# Patient Record
(demographics unavailable — no encounter records)

---

## 2024-10-29 NOTE — PHYSICAL EXAM
[Antalgic] : not antalgic [LE] : Sensory: Intact in bilateral lower extremities [de-identified] : GENERAL APPEARANCE: Well nourished and hydrated, pleasant, alert, and oriented x 3. Appears their stated age.  HEENT: Normocephalic, extraocular eye motion intact. Nasal septum midline. Oral cavity clear. External auditory canal clear.  RESPIRATORY: Breath sounds clear and audible in all lobes. No wheezing, No accessory muscle use. CARDIOVASCULAR: No apparent abnormalities. No lower leg edema. No varicosities. Pedal pulses are palpable. NEUROLOGIC: Sensation is normal, no muscle weakness in the upper or lower extremities. DERMATOLOGIC: No apparent skin lesions, moist, warm, no rash. SPINE: Cervical spine appears normal and moves freely; thoracic spine appears normal and moves freely; lumbosacral spine appears normal and moves freely, normal, nontender. MUSCULOSKELETAL: Hands, wrists, and elbows are normal and move freely, shoulders are normal and move freely.  Musculoskeletal 5/5 motor strength in bilateral lower extremities. Sensory: Intact in bilateral lower extremities. DTRs: Biceps, brachioradialis, triceps, patellar, ankle and plantar 2+ and symmetric bilaterally. Pulses: dorsalis pedis, posterior tibial, femoral, popliteal, and radial 2+ and symmetric bilaterally.  Constitutional: Alert and in no acute distress, but well-appearing.   [de-identified] : Right hip examination shows straight leg raise without groin pain and full range of motion negative Stinchfield maneuver Left knee examination shows mild varus alignment no joint effusion range of motion is 0 to 125 degree

## 2024-10-29 NOTE — DISCUSSION/SUMMARY
[de-identified] : Medication risks reviewed. Surgical risks reviewed. 69 y/o M pt presents with right lower back pain and left knee pain.  Left knee: The pt presents with bone-on-bone medial compartmental osteoarthritis of the left knee. The nature of his condition and treatment options were discussed in detail. The pt is a candidate for a left TKA. The surgery was discussed in detail. I discussed conservative treatment such as cortisone injections, HA injections, PT, anti-inflammatories, and low impact exercise. He should continue to do low impact exercises. The pt may take OTC NSAIDs and Tylenol as needed.  He has a history of MRSA infection with joint injection many years ago treated with IV antibiotics .  Patient deferred repeat injection today he may return to office to have cortisone injection before ski trip .   Right hip: The pt presents with mild right hip osteoarthritis. The pt is not a candidate for a right TORY. At this time, I suggest that the pt exhausts conservative treatment such as cortisone injections, PT, anti-inflammatories, and low impact exercise. He should continue to do low impact exercises. The pt notes improvement in pain and defers from treatment.  He complains of right sided lower back pain and right foot drop I recommend patient to  see Dr. Toure.   The patient is a 70 year year old individual with end stage arthritis of their left knee joint. The patient has exhausted a minimum of 3 months conservative treatment including prior injections (cortisone and/or hyaluronic acid injections), physical therapy, over the counter NSAIDS and pain medication where indicated. In addition, the patient's quality of life is diminished due to significant chronic pain. The patient is having difficulty with activities of daily living, including ambulating, descending stairs, and rising from a seated position. Based upon the patients continued symptoms and failure to respond to conservative treatment, I have recommended a left total knee replacement for this patient. A long discussion took place with the patient describing what a total joint replacement is and what the procedure would entail. A knee model, similar to the implant that will be used during the operation, was utilized to demonstrate and to discuss the various bearing surfaces of the implants. Implant fixation, use of cement, was also discussed with the patient. Choices of implant manufacturers, mainly [employer] and [employer], were discussed and reviewed with preference to be made by patient and surgeon prior to operation. Final selection to be based on customary practice as well as preoperative templating with selection confirmed intraoperatively based on the patient's anatomy. The patient participated and agreed with the decision-making process. The hospitalization and post-operative care and rehabilitation were also discussed. The use of perioperative antibiotics and DVT prophylaxis were discussed. The risk, benefits and alternatives to a surgical intervention were discussed at length with the patient. The patient was also advised of risks related to the medical comorbidities and elevated body mass index (BMI). A lengthy discussion took place to review the most common complications including but not limited to: deep vein thrombosis, pulmonary embolism, heart attack, stroke, infection, wound breakdown, numbness, damage to nerves, tendon, muscles, arteries or other blood vessels, death and other possible complications from anesthesia. The patient was told that we will take steps to minimize these risks by using sterile technique, antibiotics and DVT prophylaxis when appropriate and follow the patient postoperatively in the office setting to monitor progress. The possibility of recurrent pain, no improvement in pain and actual worsening of pain were also discussed with the patient.  The discharge plan of care focused on the patient going home following surgery. The patient was encouraged to make the necessary arrangements to have someone stay with them when they are discharged home. Following discharge, a home care nurse will visit the patient. The home care nurse will open your home care case and request home physical therapy services. Home physical therapy will commence following discharge provided it is appropriate and covered by the health insurance benefit plan.  The benefits of surgery were discussed with the patient including the potential for improving his/her current clinical condition through operative intervention. Alternatives to surgical intervention including continued conservative management were also discussed in detail. All questions were answered to the satisfaction of the patient. The treatment plan of care, as well as a model of a total knee equivalent to the one that will be used for their total joint replacement, was shared with the patient. The patient participated and agreed to the plan of care as well as the use of the recommended implants for their total joint replacement surgery.

## 2024-10-29 NOTE — HISTORY OF PRESENT ILLNESS
[de-identified] : 10/29/24)  Patient return to office complaining of recurrent pain in the left knee  He also reports right lower back pain we evaluated his right hip which showed mild arthritic change only he states his right foot is dropping.  He denies numbness or tingling  9/4/2024: Pt is back with left knee pain. Pt received left knee supartz injection in July.  He feels some improvement with Supartz he has mild intermittent pain at this time patient is aware that he is a candidate for knee replacement and he would like to postpone a bit.  Patient is active with skiing.   7/16/2024: This is a 69-year-old male presented with right hip and left knee pain his right hip pain was quite painful about 5 weeks ago but it did settle down at this time pain is 2 out of 10 it was achy patient had x-rays ordered by Dr. Bolton the orthopedic surgeon who has been treating his left knee patient  had left knee arthroscopy in 2015 he received gel injection in December 2023 which was helping him but now the pain has recurred he is using meloxicam prescribed by primary care physician daily.  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  He reports history of MRSA infection after joint injection in the left knee many years ago he was treated with IV antibiotic followed by ID  he is no longer on antibiotic [Pain Location] : pain [5] : a current pain level of 5/10

## 2024-10-29 NOTE — PROCEDURE
[de-identified] : Patient received left knee 80mg cortisone injection for osteoarthritis  I discussed at length with the patient the planned steroid and lidocaine injection. The risks, benefits, convalescence and alternatives were reviewed. The possible side effects discussed included but were not limited to: pain, swelling, heat, bleeding, and redness. Symptoms are generally mild but if they are extensive then contact the office. Giving pain relievers by mouth such as NSAIDs or Tylenol can generally treat the reactions to steroid and lidocaine. Rare cases of infection have been noted. Rash, hives and itching may occur post injection. If you have muscle pain or cramps, flushing and or swelling of the face, rapid heart beat, nausea, dizziness, fever, chills, headache, difficulty breathing, swelling in the arms or legs, or have a prickly feeling of your skin, contact a health care provider immediately. Following this discussion, the knee was prepped with Alcohol and under sterile condition the 80 mg Depo-Medrol and 6 cc Lidocaine injection was performed with a 20 gauge needle through a superolateral injection site. The needle was introduced into the joint, aspiration was performed to ensure intra-articular placement and the medication was injected. Upon withdrawal of the needle the site was cleaned with alcohol and a band aid applied. The patient tolerated the injection well and there were no adverse effects. Post injection instructions included no strenuous activity for 24 hours, cryotherapy and if there are any adverse effects to contact the office.

## 2024-10-29 NOTE — DISCUSSION/SUMMARY
[de-identified] : Medication risks reviewed. Surgical risks reviewed. 69 y/o M pt presents with right lower back pain and left knee pain.  Left knee: The pt presents with bone-on-bone medial compartmental osteoarthritis of the left knee. The nature of his condition and treatment options were discussed in detail. The pt is a candidate for a left TKA. The surgery was discussed in detail. I discussed conservative treatment such as cortisone injections, HA injections, PT, anti-inflammatories, and low impact exercise. He should continue to do low impact exercises. The pt may take OTC NSAIDs and Tylenol as needed.  He has a history of MRSA infection with joint injection many years ago treated with IV antibiotics .  Patient deferred repeat injection today he may return to office to have cortisone injection before ski trip .   Right hip: The pt presents with mild right hip osteoarthritis. The pt is not a candidate for a right TORY. At this time, I suggest that the pt exhausts conservative treatment such as cortisone injections, PT, anti-inflammatories, and low impact exercise. He should continue to do low impact exercises. The pt notes improvement in pain and defers from treatment.  He complains of right sided lower back pain and right foot drop I recommend patient to  see Dr. Toure.   The patient is a 70 year year old individual with end stage arthritis of their left knee joint. The patient has exhausted a minimum of 3 months conservative treatment including prior injections (cortisone and/or hyaluronic acid injections), physical therapy, over the counter NSAIDS and pain medication where indicated. In addition, the patient's quality of life is diminished due to significant chronic pain. The patient is having difficulty with activities of daily living, including ambulating, descending stairs, and rising from a seated position. Based upon the patients continued symptoms and failure to respond to conservative treatment, I have recommended a left total knee replacement for this patient. A long discussion took place with the patient describing what a total joint replacement is and what the procedure would entail. A knee model, similar to the implant that will be used during the operation, was utilized to demonstrate and to discuss the various bearing surfaces of the implants. Implant fixation, use of cement, was also discussed with the patient. Choices of implant manufacturers, mainly [employer] and [employer], were discussed and reviewed with preference to be made by patient and surgeon prior to operation. Final selection to be based on customary practice as well as preoperative templating with selection confirmed intraoperatively based on the patient's anatomy. The patient participated and agreed with the decision-making process. The hospitalization and post-operative care and rehabilitation were also discussed. The use of perioperative antibiotics and DVT prophylaxis were discussed. The risk, benefits and alternatives to a surgical intervention were discussed at length with the patient. The patient was also advised of risks related to the medical comorbidities and elevated body mass index (BMI). A lengthy discussion took place to review the most common complications including but not limited to: deep vein thrombosis, pulmonary embolism, heart attack, stroke, infection, wound breakdown, numbness, damage to nerves, tendon, muscles, arteries or other blood vessels, death and other possible complications from anesthesia. The patient was told that we will take steps to minimize these risks by using sterile technique, antibiotics and DVT prophylaxis when appropriate and follow the patient postoperatively in the office setting to monitor progress. The possibility of recurrent pain, no improvement in pain and actual worsening of pain were also discussed with the patient.  The discharge plan of care focused on the patient going home following surgery. The patient was encouraged to make the necessary arrangements to have someone stay with them when they are discharged home. Following discharge, a home care nurse will visit the patient. The home care nurse will open your home care case and request home physical therapy services. Home physical therapy will commence following discharge provided it is appropriate and covered by the health insurance benefit plan.  The benefits of surgery were discussed with the patient including the potential for improving his/her current clinical condition through operative intervention. Alternatives to surgical intervention including continued conservative management were also discussed in detail. All questions were answered to the satisfaction of the patient. The treatment plan of care, as well as a model of a total knee equivalent to the one that will be used for their total joint replacement, was shared with the patient. The patient participated and agreed to the plan of care as well as the use of the recommended implants for their total joint replacement surgery.

## 2024-10-29 NOTE — PROCEDURE
[de-identified] : Patient received left knee 80mg cortisone injection for osteoarthritis  I discussed at length with the patient the planned steroid and lidocaine injection. The risks, benefits, convalescence and alternatives were reviewed. The possible side effects discussed included but were not limited to: pain, swelling, heat, bleeding, and redness. Symptoms are generally mild but if they are extensive then contact the office. Giving pain relievers by mouth such as NSAIDs or Tylenol can generally treat the reactions to steroid and lidocaine. Rare cases of infection have been noted. Rash, hives and itching may occur post injection. If you have muscle pain or cramps, flushing and or swelling of the face, rapid heart beat, nausea, dizziness, fever, chills, headache, difficulty breathing, swelling in the arms or legs, or have a prickly feeling of your skin, contact a health care provider immediately. Following this discussion, the knee was prepped with Alcohol and under sterile condition the 80 mg Depo-Medrol and 6 cc Lidocaine injection was performed with a 20 gauge needle through a superolateral injection site. The needle was introduced into the joint, aspiration was performed to ensure intra-articular placement and the medication was injected. Upon withdrawal of the needle the site was cleaned with alcohol and a band aid applied. The patient tolerated the injection well and there were no adverse effects. Post injection instructions included no strenuous activity for 24 hours, cryotherapy and if there are any adverse effects to contact the office.

## 2024-10-29 NOTE — HISTORY OF PRESENT ILLNESS
[de-identified] : 10/29/24)  Patient return to office complaining of recurrent pain in the left knee  He also reports right lower back pain we evaluated his right hip which showed mild arthritic change only he states his right foot is dropping.  He denies numbness or tingling  9/4/2024: Pt is back with left knee pain. Pt received left knee supartz injection in July.  He feels some improvement with Supartz he has mild intermittent pain at this time patient is aware that he is a candidate for knee replacement and he would like to postpone a bit.  Patient is active with skiing.   7/16/2024: This is a 69-year-old male presented with right hip and left knee pain his right hip pain was quite painful about 5 weeks ago but it did settle down at this time pain is 2 out of 10 it was achy patient had x-rays ordered by Dr. Bolton the orthopedic surgeon who has been treating his left knee patient  had left knee arthroscopy in 2015 he received gel injection in December 2023 which was helping him but now the pain has recurred he is using meloxicam prescribed by primary care physician daily.  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  He reports history of MRSA infection after joint injection in the left knee many years ago he was treated with IV antibiotic followed by ID  he is no longer on antibiotic [Pain Location] : pain [5] : a current pain level of 5/10

## 2024-10-29 NOTE — PHYSICAL EXAM
[Antalgic] : not antalgic [LE] : Sensory: Intact in bilateral lower extremities [de-identified] : GENERAL APPEARANCE: Well nourished and hydrated, pleasant, alert, and oriented x 3. Appears their stated age.  HEENT: Normocephalic, extraocular eye motion intact. Nasal septum midline. Oral cavity clear. External auditory canal clear.  RESPIRATORY: Breath sounds clear and audible in all lobes. No wheezing, No accessory muscle use. CARDIOVASCULAR: No apparent abnormalities. No lower leg edema. No varicosities. Pedal pulses are palpable. NEUROLOGIC: Sensation is normal, no muscle weakness in the upper or lower extremities. DERMATOLOGIC: No apparent skin lesions, moist, warm, no rash. SPINE: Cervical spine appears normal and moves freely; thoracic spine appears normal and moves freely; lumbosacral spine appears normal and moves freely, normal, nontender. MUSCULOSKELETAL: Hands, wrists, and elbows are normal and move freely, shoulders are normal and move freely.  Musculoskeletal 5/5 motor strength in bilateral lower extremities. Sensory: Intact in bilateral lower extremities. DTRs: Biceps, brachioradialis, triceps, patellar, ankle and plantar 2+ and symmetric bilaterally. Pulses: dorsalis pedis, posterior tibial, femoral, popliteal, and radial 2+ and symmetric bilaterally.  Constitutional: Alert and in no acute distress, but well-appearing.   [de-identified] : Right hip examination shows straight leg raise without groin pain and full range of motion negative Stinchfield maneuver Left knee examination shows mild varus alignment no joint effusion range of motion is 0 to 125 degree

## 2024-12-09 NOTE — PHYSICAL EXAM
[de-identified] : CONSTITUTIONAL: Patient is a very pleasant individual who is well-nourished and appears stated age. PSYCHIATRIC: Alert and oriented times three and in no apparent distress, and participates with orthopedic evaluation well. HEAD: Atraumatic and nonsyndromic in appearance. EENT: No thyromegaly, EOMI. RESPIRATORY: Respiratory rate is regular, not dyspneic on examination. LYMPHATICS: There is no cervical or axillary lymphadenopathy. INTEGUMENTARY: Skin is clean, dry, and intact to bilateral lower extremities. VASCULAR: There is brisk capillary refill about the bilateral Lower Extremities with 2+ DP Pulse  Palpation: No significant pain lower lumbar spine No pain with internal or external range of motion bilateral hips  Muscle Strength Testing: Hip Flexion: 5/5 B/L Knee Extension: 5/5 B/L Knee Flexion: 5/5 B/L Dorsiflexion: 5/5 LLE, 4/5 RLE EHL: 5/5 LLE, 4/5 RLE Plantarflexion: 5/5 B/L  Sensation: SILT L2-S1 B/L except: None  Reflexes: 2+ Quadriceps/Achilles  Gait: Normal gait Able to stand on toes and able to stand on heels  Special Testing:  Negative SLR BLLE Negative clonus BLLE  [de-identified] : Standing AP, lateral, flexion and extension radiographs of the lumbar spine performed on 12/9/2024 in the Radiology Department at [facility] for the indication of low back pain are reviewed.  These studies demonstrate small degenerative scoliosis can calving the left lower lumbar spine Lateral radiographs demonstrates maintained lordosis retrolisthesis of L2 and L3 mild disc height loss at L2-3 L3-4 L4-5 with multilevel facet arthropathy from L3-L5

## 2024-12-09 NOTE — ASSESSMENT
[FreeTextEntry1] : 70-year-old male with spinal stenosis neurogenic claudication, right foot drop  I discussed with Bashir that I believe his symptoms several months ago were likely related to spinal stenosis with neurogenic claudication.  He has developed a subtle right foot drop he states that the weakness is usually better in the mornings and will start to worsen throughout the day.  I discussed and the natural history of spinal stenosis neurogenic claudication and that would recommend a lumbar MRI to properly and thoroughly evaluate We will also evaluate the source of his right foot drop/weakness I will see him back after the MRI to go over the results

## 2024-12-09 NOTE — HISTORY OF PRESENT ILLNESS
[de-identified] : Chief Complaint: Low back pain and right foot drop   History of Present Illness: 70-year-old male presenting today for initial evaluation for low back pain and bilateral hip pain and right foot weakness.  Bashir has a history of ankylosing spondylitis in June and about 6 months ago he was developing symptoms of bilateral groin and anterior thigh and back pain worsening with standing and walking and would be relieved once he sat down after 1 minute and then he would get up and walk again.  This went on for several months, positive shopping cart sign.  No lower extremity numbness.  The symptoms have improved about 3 months ago he developed right foot weakness no lower extremity pain but he notices that when he walks his foot will drag, , this gets worse throughout the day.  He denies any radicular symptoms.  He denies any back pain at this time.   Past medical history, past surgical history, medications, allergies, social history, and family history are as documented in our records today.  Notable items include: Ankylosing spondylitis   Review of Systems: I have reviewed the patient's documented Review of Systems data today, I concur with this documentation.

## 2025-01-02 NOTE — PHYSICAL EXAM
[de-identified] : MRI lumbar spine performed in [facility] on 12/12/2024 axial sagittal T1-T2 through weighted images demonstrates at L2-3 there is a left paracentral disc extrusion compressing the traversing left L3 nerve root At L3-4 there is residual remnants of the extruded disc causing left foraminal stenosis compression of the exiting left L3 nerve root At L4-5 there is disc bulging ligamentum flavum hypertrophy contributing to lateral recess stenosis compression of the right L5 nerve root L5-S1 mild degenerative changes

## 2025-01-02 NOTE — REASON FOR VISIT
[Follow-Up Visit] : a follow-up visit for [Back Pain] : back pain [Spouse] : spouse [Family Member] : family member

## 2025-01-02 NOTE — ASSESSMENT
[FreeTextEntry1] : 70-year-old male with lumbar spondylosis  I discussed with Bashir that his severe left hip pain can be caused from that extruded disc compression of the left L3 nerve root however the symptoms have resolved His right foot weakness is also improved and is related to his L4-L5 spondylosis compression right L5 nerve root and lateral recess.  Currently have no symptoms and his strength is improved We will continue conservative management he will come back and see me if his symptoms worsen or if he has any new issues in the future

## 2025-01-02 NOTE — HISTORY OF PRESENT ILLNESS
[de-identified] : Chief Complaint: Low back and leg pain   History of Present Illness: 70-year-old male with a history of severe bilateral left greater than right hip pain and right foot weakness/dropfoot here today to review his lumbar MRI.  He states that his back pain is improved his leg pain has improved his right foot weakness is also improved he is currently active with minimal complaints.   Past medical history, past surgical history, medications, allergies, social history, and family history are as documented in our records today.  Notable items include: None   Review of Systems: I have reviewed the patient's documented Review of Systems data today, I concur with this documentation.

## 2025-02-04 NOTE — PROCEDURE
[de-identified] : Patient received left knee 80mg cortisone injection for osteoarthritis  I discussed at length with the patient the planned steroid and lidocaine injection. The risks, benefits, convalescence and alternatives were reviewed. The possible side effects discussed included but were not limited to: pain, swelling, heat, bleeding, and redness. Symptoms are generally mild but if they are extensive then contact the office. Giving pain relievers by mouth such as NSAIDs or Tylenol can generally treat the reactions to steroid and lidocaine. Rare cases of infection have been noted. Rash, hives and itching may occur post injection. If you have muscle pain or cramps, flushing and or swelling of the face, rapid heart beat, nausea, dizziness, fever, chills, headache, difficulty breathing, swelling in the arms or legs, or have a prickly feeling of your skin, contact a health care provider immediately. Following this discussion, the knee was prepped with Alcohol and under sterile condition the 80 mg Depo-Medrol and 6 cc Lidocaine injection was performed with a 20 gauge needle through a superolateral injection site. The needle was introduced into the joint, aspiration was performed to ensure intra-articular placement and the medication was injected. Upon withdrawal of the needle the site was cleaned with alcohol and a band aid applied. The patient tolerated the injection well and there were no adverse effects. Post injection instructions included no strenuous activity for 24 hours, cryotherapy and if there are any adverse effects to contact the office.

## 2025-02-04 NOTE — HISTORY OF PRESENT ILLNESS
[de-identified] :  2/24/25) patient is here for follow-up of left knee pain he has end-stage osteoarthritis but he is pain has been controlled with cortisone and HA injection.  He had problem with lower back pain and right foot drop of left hip pain seen by Dr. Toure currently he denies lower back pain and right foot drop left hip pain has resolved he is working  10/29/24)  Patient return to office complaining of recurrent pain in the left knee  He also reports right lower back pain we evaluated his right hip which showed mild arthritic change only he states his right foot is dropping.  He denies numbness or tingling  9/4/2024: Pt is back with left knee pain. Pt received left knee supartz injection in July.  He feels some improvement with Supartz he has mild intermittent pain at this time patient is aware that he is a candidate for knee replacement and he would like to postpone a bit.  Patient is active with skiing.   7/16/2024: This is a 69-year-old male presented with right hip and left knee pain his right hip pain was quite painful about 5 weeks ago but it did settle down at this time pain is 2 out of 10 it was achy patient had x-rays ordered by Dr. Bolton the orthopedic surgeon who has been treating his left knee patient  had left knee arthroscopy in 2015 he received gel injection in December 2023 which was helping him but now the pain has recurred he is using meloxicam prescribed by primary care physician daily.  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  He reports history of MRSA infection after joint injection in the left knee many years ago he was treated with IV antibiotic followed by ID  he is no longer on antibiotic [Pain Location] : pain [Stable] : stable [6] : a current pain level of 6/10 [Walking] : worsened by walking [Rest] : relieved by rest

## 2025-02-04 NOTE — DISCUSSION/SUMMARY
[de-identified] : Medication risks reviewed. Surgical risks reviewed. 71 y/o M pt presents with left knee pain.  Left knee: The pt presents with bone-on-bone medial compartmental osteoarthritis of the left knee. The nature of his condition and treatment options were discussed in detail. The pt is a candidate for a left TKA. The surgery was discussed in detail. I discussed conservative treatment such as cortisone injections, HA injections, PT, anti-inflammatories, and low impact exercise. He should continue to do low impact exercises. The pt may take OTC NSAIDs and Tylenol as needed.  He has a history of MRSA infection with joint injection many years ago treated with IV antibiotics .  Patient deferred repeat injection today.  Patient is interested pursuing HA injection which helped reducing his pain last series in July 2024 I ordered Supartz injection today patient will return to office when the medication is available   The patient is a 70 year year old individual with end stage arthritis of their left knee joint. The patient has exhausted a minimum of 3 months conservative treatment including prior injections (cortisone and/or hyaluronic acid injections), physical therapy, over the counter NSAIDS and pain medication where indicated. In addition, the patient's quality of life is diminished due to significant chronic pain. The patient is having difficulty with activities of daily living, including ambulating, descending stairs, and rising from a seated position. Based upon the patients continued symptoms and failure to respond to conservative treatment, I have recommended a left total knee replacement for this patient. A long discussion took place with the patient describing what a total joint replacement is and what the procedure would entail. A knee model, similar to the implant that will be used during the operation, was utilized to demonstrate and to discuss the various bearing surfaces of the implants. Implant fixation, use of cement, was also discussed with the patient. Choices of implant manufacturers, mainly [employer] and [employer], were discussed and reviewed with preference to be made by patient and surgeon prior to operation. Final selection to be based on customary practice as well as preoperative templating with selection confirmed intraoperatively based on the patient's anatomy. The patient participated and agreed with the decision-making process. The hospitalization and post-operative care and rehabilitation were also discussed. The use of perioperative antibiotics and DVT prophylaxis were discussed. The risk, benefits and alternatives to a surgical intervention were discussed at length with the patient. The patient was also advised of risks related to the medical comorbidities and elevated body mass index (BMI). A lengthy discussion took place to review the most common complications including but not limited to: deep vein thrombosis, pulmonary embolism, heart attack, stroke, infection, wound breakdown, numbness, damage to nerves, tendon, muscles, arteries or other blood vessels, death and other possible complications from anesthesia. The patient was told that we will take steps to minimize these risks by using sterile technique, antibiotics and DVT prophylaxis when appropriate and follow the patient postoperatively in the office setting to monitor progress. The possibility of recurrent pain, no improvement in pain and actual worsening of pain were also discussed with the patient.  The discharge plan of care focused on the patient going home following surgery. The patient was encouraged to make the necessary arrangements to have someone stay with them when they are discharged home. Following discharge, a home care nurse will visit the patient. The home care nurse will open your home care case and request home physical therapy services. Home physical therapy will commence following discharge provided it is appropriate and covered by the health insurance benefit plan.  The benefits of surgery were discussed with the patient including the potential for improving his/her current clinical condition through operative intervention. Alternatives to surgical intervention including continued conservative management were also discussed in detail. All questions were answered to the satisfaction of the patient. The treatment plan of care, as well as a model of a total knee equivalent to the one that will be used for their total joint replacement, was shared with the patient. The patient participated and agreed to the plan of care as well as the use of the recommended implants for their total joint replacement surgery.

## 2025-02-04 NOTE — PHYSICAL EXAM
[Antalgic] : not antalgic [LE] : Sensory: Intact in bilateral lower extremities [de-identified] : GENERAL APPEARANCE: Well nourished and hydrated, pleasant, alert, and oriented x 3. Appears their stated age.  HEENT: Normocephalic, extraocular eye motion intact. Nasal septum midline. Oral cavity clear. External auditory canal clear.  RESPIRATORY: Breath sounds clear and audible in all lobes. No wheezing, No accessory muscle use. CARDIOVASCULAR: No apparent abnormalities. No lower leg edema. No varicosities. Pedal pulses are palpable. NEUROLOGIC: Sensation is normal, no muscle weakness in the upper or lower extremities. DERMATOLOGIC: No apparent skin lesions, moist, warm, no rash. SPINE: Cervical spine appears normal and moves freely; thoracic spine appears normal and moves freely; lumbosacral spine appears normal and moves freely, normal, nontender. MUSCULOSKELETAL: Hands, wrists, and elbows are normal and move freely, shoulders are normal and move freely.  Musculoskeletal 5/5 motor strength in bilateral lower extremities. Sensory: Intact in bilateral lower extremities. DTRs: Biceps, brachioradialis, triceps, patellar, ankle and plantar 2+ and symmetric bilaterally. Pulses: dorsalis pedis, posterior tibial, femoral, popliteal, and radial 2+ and symmetric bilaterally.  Constitutional: Alert and in no acute distress, but well-appearing.   [de-identified] : Left knee examination shows mild varus alignment no joint effusion range of motion is 0 to 125 degree

## 2025-05-06 NOTE — REASON FOR VISIT
[Follow-Up Visit] : a follow-up visit for [Other: ____] : [unfilled] [FreeTextEntry2] : Left knee Supartz Lot # [id number] exp 09/30/27

## 2025-05-06 NOTE — PROCEDURE
[de-identified] : Pt received left knee knee 1/3 Supartz 25 mg injection for o.a Knee injection viscosupplementation: I discussed at length with the patient the planned H.A injection for primary osteoarthritis. The risks, benefits, convalescence and alternatives were reviewed and pt consented for injection. The possible side effects discussed included but were not limited to: pain, swelling, heat, stiffness and fullness. There symptoms are generally mild but if they are extensive then contact the office. Giving pain relievers by mouth such as NSAID's or Tylenol can generally treat the reactions to injection. Rare cases of infection have been noted. Rash, hives and itching may occur post injection. If you have muscle pain or cramps, flushing and or swelling of the face, rapid heart beat, nausea, dizziness, fever, chills, headache, difficulty breathing, swelling in the arms or legs, or have a prickly feeling of your skin, contact a health care provider immediately. Following this discussion, the knee was prepped with alcohol and under sterile condition the injection was performed through a superolateral injection site with a 20 gauge needle. The needle was introduced into the joint, aspiration was performed to ensure intra-articular placement and the medication was injected. Upon withdrawal of the needle the site was cleaned with alcohol and a band aid applied. The patient tolerated the injection well and there were no adverse effects. Post injection instructions included no strenuous activity for 24 hours, cryotherapy and if there are any adverse effects to contact the office.

## 2025-05-06 NOTE — DISCUSSION/SUMMARY
[de-identified] : Medication risks reviewed. Surgical risks reviewed. 69 y/o M pt presents with  left knee pain.  Left knee: The pt presents with bone-on-bone medial compartmental osteoarthritis of the left knee. The nature of his condition and treatment options were discussed in detail. The pt is a candidate for a left TKA. The surgery was discussed in detail. I discussed conservative treatment such as cortisone injections, HA injections, PT, anti-inflammatories, and low impact exercise. He should continue to do low impact exercises. The pt may take OTC NSAIDs and Tylenol as needed. He has a history of MRSA infection with joint injection many years ago treated with IV antibiotics. the pt opted for a left knee supartz 1st injection. The pt will f/u in 1 week for 2nd injection I spent 20 minutes review of medical history, assessing current issue, conduct  physical exam ,  review of diagnostic studies,  discuss the diagnosis,  couseling regarding pathophysiology of its condition  and its treatment plan  with the patient.  The patient is a 70 year year old individual with end stage arthritis of their left knee joint. The patient has exhausted a minimum of 3 months conservative treatment including prior injections (cortisone and/or hyaluronic acid injections), physical therapy, over the counter NSAIDS and pain medication where indicated. In addition, the patient's quality of life is diminished due to significant chronic pain. The patient is having difficulty with activities of daily living, including ambulating, descending stairs, and rising from a seated position. Based upon the patients continued symptoms and failure to respond to conservative treatment, I have recommended a left total knee replacement for this patient. A long discussion took place with the patient describing what a total joint replacement is and what the procedure would entail. A knee model, similar to the implant that will be used during the operation, was utilized to demonstrate and to discuss the various bearing surfaces of the implants. Implant fixation, use of cement, was also discussed with the patient. Choices of implant manufacturers, mainly [employer] and [employer], were discussed and reviewed with preference to be made by patient and surgeon prior to operation. Final selection to be based on customary practice as well as preoperative templating with selection confirmed intraoperatively based on the patient's anatomy. The patient participated and agreed with the decision-making process. The hospitalization and post-operative care and rehabilitation were also discussed. The use of perioperative antibiotics and DVT prophylaxis were discussed. The risk, benefits and alternatives to a surgical intervention were discussed at length with the patient. The patient was also advised of risks related to the medical comorbidities and elevated body mass index (BMI). A lengthy discussion took place to review the most common complications including but not limited to: deep vein thrombosis, pulmonary embolism, heart attack, stroke, infection, wound breakdown, numbness, damage to nerves, tendon, muscles, arteries or other blood vessels, death and other possible complications from anesthesia. The patient was told that we will take steps to minimize these risks by using sterile technique, antibiotics and DVT prophylaxis when appropriate and follow the patient postoperatively in the office setting to monitor progress. The possibility of recurrent pain, no improvement in pain and actual worsening of pain were also discussed with the patient.  The discharge plan of care focused on the patient going home following surgery. The patient was encouraged to make the necessary arrangements to have someone stay with them when they are discharged home. Following discharge, a home care nurse will visit the patient. The home care nurse will open your home care case and request home physical therapy services. Home physical therapy will commence following discharge provided it is appropriate and covered by the health insurance benefit plan.  The benefits of surgery were discussed with the patient including the potential for improving his/her current clinical condition through operative intervention. Alternatives to surgical intervention including continued conservative management were also discussed in detail. All questions were answered to the satisfaction of the patient. The treatment plan of care, as well as a model of a total knee equivalent to the one that will be used for their total joint replacement, was shared with the patient. The patient participated and agreed to the plan of care as well as the use of the recommended implants for their total joint replacement surgery.

## 2025-05-06 NOTE — HISTORY OF PRESENT ILLNESS
[de-identified] : This is a 70-year-old male presented with right hip and left knee pain his right hip pain was quite painful about 5 weeks ago but it did settle down at this time pain is 2 out of 10 it was achy patient had x-rays ordered by Dr. Bolton the orthopedic surgeon who has been treating his left knee patient had left knee arthroscopy in 2015 he received gel injection in December 2023 which was helping him but now the pain has recurred he is using meloxicam prescribed by primary care physician daily. 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 He reports history of MRSA infection after joint injection in the left knee many years ago he was treated with IV antibiotic followed by ID he is no longer on antibiotic [5] : a current pain level of 5/10

## 2025-05-06 NOTE — PHYSICAL EXAM
[Antalgic] : not antalgic [LE] : Sensory: Intact in bilateral lower extremities [de-identified] : GENERAL APPEARANCE: Well nourished and hydrated, pleasant, alert, and oriented x 3. Appears their stated age.  HEENT: Normocephalic, extraocular eye motion intact. Nasal septum midline. Oral cavity clear. External auditory canal clear.  RESPIRATORY: Breath sounds clear and audible in all lobes. No wheezing, No accessory muscle use. CARDIOVASCULAR: No apparent abnormalities. No lower leg edema. No varicosities. Pedal pulses are palpable. NEUROLOGIC: Sensation is normal, no muscle weakness in the upper or lower extremities. DERMATOLOGIC: No apparent skin lesions, moist, warm, no rash. SPINE: Cervical spine appears normal and moves freely; thoracic spine appears normal and moves freely; lumbosacral spine appears normal and moves freely, normal, nontender. MUSCULOSKELETAL: Hands, wrists, and elbows are normal and move freely, shoulders are normal and move freely.  Musculoskeletal 5/5 motor strength in bilateral lower extremities. Sensory: Intact in bilateral lower extremities. DTRs: Biceps, brachioradialis, triceps, patellar, ankle and plantar 2+ and symmetric bilaterally. Pulses: dorsalis pedis, posterior tibial, femoral, popliteal, and radial 2+ and symmetric bilaterally.  Constitutional: Alert and in no acute distress, but well-appearing.   [de-identified] : Left knee examination shows mild varus alignment no joint effusion range of motion is 0 to 125 degree

## 2025-05-13 NOTE — DISCUSSION/SUMMARY
[de-identified] : Medication risks reviewed. Surgical risks reviewed. 69 y/o M pt presents with  left knee pain.  Left knee: The pt presents with bone-on-bone medial compartmental osteoarthritis of the left knee. The nature of his condition and treatment options were discussed in detail. The pt is a candidate for a left TKA. The surgery was discussed in detail. I discussed conservative treatment such as cortisone injections, HA injections, PT, anti-inflammatories, and low impact exercise. He should continue to do low impact exercises. The pt may take OTC NSAIDs and Tylenol as needed. He has a history of MRSA infection with joint injection many years ago treated with IV antibiotics. the pt opted for a left knee supartz  2nd injection. The pt will f/u in 1 week for 3rd injection  The patient is a 70 year year old individual with end stage arthritis of their left knee joint. The patient has exhausted a minimum of 3 months conservative treatment including prior injections (cortisone and/or hyaluronic acid injections), physical therapy, over the counter NSAIDS and pain medication where indicated. In addition, the patient's quality of life is diminished due to significant chronic pain. The patient is having difficulty with activities of daily living, including ambulating, descending stairs, and rising from a seated position. Based upon the patients continued symptoms and failure to respond to conservative treatment, I have recommended a left total knee replacement for this patient. A long discussion took place with the patient describing what a total joint replacement is and what the procedure would entail. A knee model, similar to the implant that will be used during the operation, was utilized to demonstrate and to discuss the various bearing surfaces of the implants. Implant fixation, use of cement, was also discussed with the patient. Choices of implant manufacturers, mainly [employer] and [employer], were discussed and reviewed with preference to be made by patient and surgeon prior to operation. Final selection to be based on customary practice as well as preoperative templating with selection confirmed intraoperatively based on the patient's anatomy. The patient participated and agreed with the decision-making process. The hospitalization and post-operative care and rehabilitation were also discussed. The use of perioperative antibiotics and DVT prophylaxis were discussed. The risk, benefits and alternatives to a surgical intervention were discussed at length with the patient. The patient was also advised of risks related to the medical comorbidities and elevated body mass index (BMI). A lengthy discussion took place to review the most common complications including but not limited to: deep vein thrombosis, pulmonary embolism, heart attack, stroke, infection, wound breakdown, numbness, damage to nerves, tendon, muscles, arteries or other blood vessels, death and other possible complications from anesthesia. The patient was told that we will take steps to minimize these risks by using sterile technique, antibiotics and DVT prophylaxis when appropriate and follow the patient postoperatively in the office setting to monitor progress. The possibility of recurrent pain, no improvement in pain and actual worsening of pain were also discussed with the patient.  The discharge plan of care focused on the patient going home following surgery. The patient was encouraged to make the necessary arrangements to have someone stay with them when they are discharged home. Following discharge, a home care nurse will visit the patient. The home care nurse will open your home care case and request home physical therapy services. Home physical therapy will commence following discharge provided it is appropriate and covered by the health insurance benefit plan.  The benefits of surgery were discussed with the patient including the potential for improving his/her current clinical condition through operative intervention. Alternatives to surgical intervention including continued conservative management were also discussed in detail. All questions were answered to the satisfaction of the patient. The treatment plan of care, as well as a model of a total knee equivalent to the one that will be used for their total joint replacement, was shared with the patient. The patient participated and agreed to the plan of care as well as the use of the recommended implants for their total joint replacement surgery.

## 2025-05-13 NOTE — PROCEDURE
[de-identified] : Pt received left knee knee 2/3 Supartz 25 mg injection for o.a Knee injection viscosupplementation: I discussed at length with the patient the planned H.A injection for primary osteoarthritis. The risks, benefits, convalescence and alternatives were reviewed and pt consented for injection. The possible side effects discussed included but were not limited to: pain, swelling, heat, stiffness and fullness. There symptoms are generally mild but if they are extensive then contact the office. Giving pain relievers by mouth such as NSAID's or Tylenol can generally treat the reactions to injection. Rare cases of infection have been noted. Rash, hives and itching may occur post injection. If you have muscle pain or cramps, flushing and or swelling of the face, rapid heart beat, nausea, dizziness, fever, chills, headache, difficulty breathing, swelling in the arms or legs, or have a prickly feeling of your skin, contact a health care provider immediately. Following this discussion, the knee was prepped with alcohol and under sterile condition the injection was performed through a superolateral injection site with a 20 gauge needle. The needle was introduced into the joint, aspiration was performed to ensure intra-articular placement and the medication was injected. Upon withdrawal of the needle the site was cleaned with alcohol and a band aid applied. The patient tolerated the injection well and there were no adverse effects. Post injection instructions included no strenuous activity for 24 hours, cryotherapy and if there are any adverse effects to contact the office.

## 2025-05-13 NOTE — REASON FOR VISIT
[Follow-Up Visit] : a follow-up visit for [Other: ____] : [unfilled] [FreeTextEntry2] :  Left knee Supartz #2 Lot # [id number] exp 09/30/27.

## 2025-05-15 NOTE — DISCUSSION/SUMMARY
[de-identified] : Assessment: 70-year-old male with left shoulder pain secondary to adhesive capsulitis  Plan: I had a long discussion with the patient today regarding the nature of their diagnosis and treatment plan. We discussed the risks and benefits of no treatment as well as nonoperative and operative treatments.  I reviewed the patient's x-rays today with him in the office which are negative for any acute pathology.  On examination today he has limitations to active and passive range of motion consistent with adhesive capsulitis.  At this time I recommend conservative treatment of the patient's condition with modalities including rest, ice, heat, anti-inflammatory medications, activity modifications, and home stretching and strengthening exercises. I discussed with the patient the risks and benefits associated with NSAID use. GI precautions were discussed.  A referral for physical therapy was provided to begin working on exercises to help improve their strength and function.  Additionally, cortisone injection was administered to the patient's left shoulder subacromial space.  The patient tolerated this well and there were no adverse effects.  He will follow-up in 8 weeks repeat clinical assessment.  If symptoms persist we may consider an ultrasound-guided glenohumeral joint cortisone injection versus an MRI.  The patient verbalizes their understanding and agrees with the plan.  All questions were answered to their satisfaction.   I, Dr. Gardner, personally performed the evaluation and management (E/M) services for this new patient.  That E/M includes conducting the clinically appropriate initial history &/or exam, assessing all conditions, and establishing the plan of care.  Today, my JOE, was here to observe my evaluation and management service for this patient & follow plan of care established by me going forward.

## 2025-05-15 NOTE — PROCEDURE
[de-identified] : I injected the patient's left shoulder today with cortisone.  I discussed at length with the patient the planned steroid and lidocaine injection. The risks, benefits, convalescence and alternatives were reviewed. The possible side effects discussed included but were not limited to: pain, swelling, heat, bleeding, and redness. Symptoms are generally mild but if they are extensive then contact the office. Giving pain relievers by mouth such as NSAIDs or Tylenol can generally treat the reactions to steroid and lidocaine. Rare cases of infection have been noted. Rash, hives and itching may occur post injection. If you have muscle pain or cramps, flushing and or swelling of the face, rapid heart beat, nausea, dizziness, fever, chills, headache, difficulty breathing, swelling in the arms or legs, or have a prickly feeling of your skin, contact a health care provider immediately. Following this discussion, the shoulder was prepped with Chlorhexidine and Alcohol and under sterile conditions the 80 mg Depo-Medrol and 4 cc Lidocaine injection was performed with a 22 gauge needle through a posterolateral injection site. The needle was introduced into the subacromial space and the medication was injected. Upon withdrawal of the needle the site was cleaned with alcohol and a band aid was applied. The patient tolerated the injection well and there were no adverse effects. Post injection instructions included no strenuous activity for 24 hours, cryotherapy and if there are any adverse effects to contact the office.

## 2025-05-15 NOTE — PHYSICAL EXAM
[de-identified] : General: Awake, alert, no acute distress, Patient was cooperative and appropriate during the examination.  Walks without an antalgic gait.   Left shoulder Exam: Physical exam of the shoulder demonstrates normal skin without signs of skin changes or abnormalities. No erythema, warmth, or joint effusion appreciated.  Sensation intact light touch C5-T1 Palpable radial pulse Radial/ulnar/median/axillary/musculocutaneous/AIN/PIN nerves grossly intact  Range of motion: Forward Flexion: 140 active and passive Abduction: 85 active and passive External Rotation: 20 active and passive Internal Rotation: Back pocket active and passive  Palpation: Not tender to palpation over the glenohumeral joint Moderately tender palpation over the rotator cuff insertion on the greater tuberosity Not tender to palpation over the AC joint Mildly tender to palpation of the biceps tendon/bicipital groove  Strength testing: Supraspinatus: 5/5 Infraspinatus: 5/5 Subscapularis: 5/5  Special test: Empty can test positive Carlson impingement test positive Speeds test negative Oliver's test negative Lift-off test negative Bell-press test negative Cross-arm adduction test negative   [de-identified] : X-rays including 4 views of the left shoulder obtained in the office on 5/15/2025 and reviewed the patient.  There is no acute fracture or dislocation.  Mild degenerative changes are noted.

## 2025-05-20 NOTE — PROCEDURE
[de-identified] : Pt received left knee knee 3/3 Supartz 25 mg injection for o.a Knee injection viscosupplementation: I discussed at length with the patient the planned H.A injection for primary osteoarthritis. The risks, benefits, convalescence and alternatives were reviewed and pt consented for injection. The possible side effects discussed included but were not limited to: pain, swelling, heat, stiffness and fullness. There symptoms are generally mild but if they are extensive then contact the office. Giving pain relievers by mouth such as NSAID's or Tylenol can generally treat the reactions to injection. Rare cases of infection have been noted. Rash, hives and itching may occur post injection. If you have muscle pain or cramps, flushing and or swelling of the face, rapid heart beat, nausea, dizziness, fever, chills, headache, difficulty breathing, swelling in the arms or legs, or have a prickly feeling of your skin, contact a health care provider immediately. Following this discussion, the knee was prepped with alcohol and under sterile condition the injection was performed through a superolateral injection site with a 20 gauge needle. The needle was introduced into the joint, aspiration was performed to ensure intra-articular placement and the medication was injected. Upon withdrawal of the needle the site was cleaned with alcohol and a band aid applied. The patient tolerated the injection well and there were no adverse effects. Post injection instructions included no strenuous activity for 24 hours, cryotherapy and if there are any adverse effects to contact the office.

## 2025-05-20 NOTE — REASON FOR VISIT
[Follow-Up Visit] : a follow-up visit for [Other: ____] : [unfilled] [FreeTextEntry2] : Left knee Supartz #3 Lot # [id number] exp 09/30/27.

## 2025-05-20 NOTE — DISCUSSION/SUMMARY
[de-identified] : Medication risks reviewed. Surgical risks reviewed. 69 y/o M pt presents with  left knee pain.  Left knee: The pt presents with bone-on-bone medial compartmental osteoarthritis of the left knee. The nature of his condition and treatment options were discussed in detail. The pt is a candidate for a left TKA. The surgery was discussed in detail. I discussed conservative treatment such as cortisone injections, HA injections, PT, anti-inflammatories, and low impact exercise. He should continue to do low impact exercises. The pt may take OTC NSAIDs and Tylenol as needed. He has a history of MRSA infection with joint injection many years ago treated with IV antibiotics. the pt opted for a left knee supartz  3rd injection. The pt will f/u in 3m   The patient is a 70 year year old individual with end stage arthritis of their left knee joint. The patient has exhausted a minimum of 3 months conservative treatment including prior injections (cortisone and/or hyaluronic acid injections), physical therapy, over the counter NSAIDS and pain medication where indicated. In addition, the patient's quality of life is diminished due to significant chronic pain. The patient is having difficulty with activities of daily living, including ambulating, descending stairs, and rising from a seated position. Based upon the patients continued symptoms and failure to respond to conservative treatment, I have recommended a left total knee replacement for this patient. A long discussion took place with the patient describing what a total joint replacement is and what the procedure would entail. A knee model, similar to the implant that will be used during the operation, was utilized to demonstrate and to discuss the various bearing surfaces of the implants. Implant fixation, use of cement, was also discussed with the patient. Choices of implant manufacturers, mainly [employer] and [employer], were discussed and reviewed with preference to be made by patient and surgeon prior to operation. Final selection to be based on customary practice as well as preoperative templating with selection confirmed intraoperatively based on the patient's anatomy. The patient participated and agreed with the decision-making process. The hospitalization and post-operative care and rehabilitation were also discussed. The use of perioperative antibiotics and DVT prophylaxis were discussed. The risk, benefits and alternatives to a surgical intervention were discussed at length with the patient. The patient was also advised of risks related to the medical comorbidities and elevated body mass index (BMI). A lengthy discussion took place to review the most common complications including but not limited to: deep vein thrombosis, pulmonary embolism, heart attack, stroke, infection, wound breakdown, numbness, damage to nerves, tendon, muscles, arteries or other blood vessels, death and other possible complications from anesthesia. The patient was told that we will take steps to minimize these risks by using sterile technique, antibiotics and DVT prophylaxis when appropriate and follow the patient postoperatively in the office setting to monitor progress. The possibility of recurrent pain, no improvement in pain and actual worsening of pain were also discussed with the patient.  The discharge plan of care focused on the patient going home following surgery. The patient was encouraged to make the necessary arrangements to have someone stay with them when they are discharged home. Following discharge, a home care nurse will visit the patient. The home care nurse will open your home care case and request home physical therapy services. Home physical therapy will commence following discharge provided it is appropriate and covered by the health insurance benefit plan.  The benefits of surgery were discussed with the patient including the potential for improving his/her current clinical condition through operative intervention. Alternatives to surgical intervention including continued conservative management were also discussed in detail. All questions were answered to the satisfaction of the patient. The treatment plan of care, as well as a model of a total knee equivalent to the one that will be used for their total joint replacement, was shared with the patient. The patient participated and agreed to the plan of care as well as the use of the recommended implants for their total joint replacement surgery.

## 2025-07-29 NOTE — HISTORY OF PRESENT ILLNESS
[Pain Location] : pain [5] : a current pain level of 5/10 [Walking] : worsened by walking [Rest] : relieved by rest [de-identified] : This is a 70-year-old male presented with right hip and left knee pain his right hip pain was quite painful about 5 weeks ago but it did settle down at this time pain is 2 out of 10 it was achy patient had x-rays ordered by Dr. Bolton the orthopedic surgeon who has been treating his left knee patient had left knee arthroscopy in 2015 he received gel injection in December 2023 which was helping him but now the pain has recurred he is using meloxicam prescribed by primary care physician daily. 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 He reports history of MRSA infection after joint injection in the left knee many years ago he was treated with IV antibiotic followed by ID he is no longer on antibiotic

## 2025-07-29 NOTE — PHYSICAL EXAM
[LE] : Sensory: Intact in bilateral lower extremities [Antalgic] : not antalgic [de-identified] : GENERAL APPEARANCE: Well nourished and hydrated, pleasant, alert, and oriented x 3. Appears their stated age.  HEENT: Normocephalic, extraocular eye motion intact. Nasal septum midline. Oral cavity clear. External auditory canal clear.  RESPIRATORY: Breath sounds clear and audible in all lobes. No wheezing, No accessory muscle use. CARDIOVASCULAR: No apparent abnormalities. No lower leg edema. No varicosities. Pedal pulses are palpable. NEUROLOGIC: Sensation is normal, no muscle weakness in the upper or lower extremities. DERMATOLOGIC: No apparent skin lesions, moist, warm, no rash. SPINE: Cervical spine appears normal and moves freely; thoracic spine appears normal and moves freely; lumbosacral spine appears normal and moves freely, normal, nontender. MUSCULOSKELETAL: Hands, wrists, and elbows are normal and move freely, shoulders are normal and move freely.  Musculoskeletal 5/5 motor strength in bilateral lower extremities. Sensory: Intact in bilateral lower extremities. DTRs: Biceps, brachioradialis, triceps, patellar, ankle and plantar 2+ and symmetric bilaterally. Pulses: dorsalis pedis, posterior tibial, femoral, popliteal, and radial 2+ and symmetric bilaterally.  Constitutional: Alert and in no acute distress, but well-appearing.   [de-identified] : Left knee examination shows mild varus alignment no joint effusion range of motion is 0 to 125 degree [de-identified] : 3 view left knee x-ray shows medial compartment bone-on-bone osteoarthritis he also has some bone spurs and patellofemoral joint  He has progression of his disease compared to x-rays done a year ago

## 2025-07-29 NOTE — DISCUSSION/SUMMARY
[de-identified] : Medication risks reviewed. Surgical risks reviewed. 71 y/o M pt presents with  left knee pain.  Left knee: The pt presents with bone-on-bone medial compartmental osteoarthritis of the left knee. The nature of his condition and treatment options were discussed in detail. The pt is a candidate for a left TKA.  His updated x-rays demonstrate progression of the disease he has osteophyte formation of the patellofemoral joint and considering his age pressure replacement is not advised.  the surgery was discussed in detail. I discussed conservative treatment such as cortisone injections, HA injections, PT, anti-inflammatories, and low impact exercise. He should continue to do low impact exercises. The pt may take OTC NSAIDs and Tylenol as needed. He has a history of MRSA infection with joint injection many years ago treated with IV antibiotics. the pt opted for a left knee  cortisone injection. The pt will f/u in 3m   I spent 30minutes reviewing medical history, assessing current issue, conduct  physical exam ,  review of diagnostic studies,  discuss the diagnosis,  couseling regarding pathophysiology of its condition  and its treatment plan  with the patient.  The patient is a 70 year old individual with end stage arthritis of their left knee joint. The patient has exhausted a minimum of 3 months conservative treatment including prior injections (cortisone and/or hyaluronic acid injections), physical therapy, over the counter NSAIDS and pain medication where indicated. In addition, the patient's quality of life is diminished due to significant chronic pain. The patient is having difficulty with activities of daily living, including ambulating, descending stairs, and rising from a seated position. Based upon the patients continued symptoms and failure to respond to conservative treatment, I have recommended a left total knee replacement for this patient. A long discussion took place with the patient describing what a total joint replacement is and what the procedure would entail. A knee model, similar to the implant that will be used during the operation, was utilized to demonstrate and to discuss the various bearing surfaces of the implants. Implant fixation, use of cement, was also discussed with the patient. Choices of implant manufacturers, mainly [employer] and [employer], were discussed and reviewed with preference to be made by patient and surgeon prior to operation. Final selection to be based on customary practice as well as preoperative templating with selection confirmed intraoperatively based on the patient's anatomy. The patient participated and agreed with the decision-making process. The hospitalization and post-operative care and rehabilitation were also discussed. The use of perioperative antibiotics and DVT prophylaxis were discussed. The risk, benefits and alternatives to a surgical intervention were discussed at length with the patient. The patient was also advised of risks related to the medical comorbidities and elevated body mass index (BMI). A lengthy discussion took place to review the most common complications including but not limited to: deep vein thrombosis, pulmonary embolism, heart attack, stroke, infection, wound breakdown, numbness, damage to nerves, tendon, muscles, arteries or other blood vessels, death and other possible complications from anesthesia. The patient was told that we will take steps to minimize these risks by using sterile technique, antibiotics and DVT prophylaxis when appropriate and follow the patient postoperatively in the office setting to monitor progress. The possibility of recurrent pain, no improvement in pain and actual worsening of pain were also discussed with the patient.  The discharge plan of care focused on the patient going home following surgery. The patient was encouraged to make the necessary arrangements to have someone stay with them when they are discharged home. Following discharge, a home care nurse will visit the patient. The home care nurse will open your home care case and request home physical therapy services. Home physical therapy will commence following discharge provided it is appropriate and covered by the health insurance benefit plan.  The benefits of surgery were discussed with the patient including the potential for improving his/her current clinical condition through operative intervention. Alternatives to surgical intervention including continued conservative management were also discussed in detail. All questions were answered to the satisfaction of the patient. The treatment plan of care, as well as a model of a total knee equivalent to the one that will be used for their total joint replacement, was shared with the patient. The patient participated and agreed to the plan of care as well as the use of the recommended implants for their total joint replacement surgery.

## 2025-07-29 NOTE — PROCEDURE
[de-identified] : Patient received left knee 80mg cortisone injection for osteoarthritis  I discussed at length with the patient the planned steroid and lidocaine injection. The risks, benefits, convalescence and alternatives were reviewed. The possible side effects discussed included but were not limited to: pain, swelling, heat, bleeding, and redness. Symptoms are generally mild but if they are extensive then contact the office. Giving pain relievers by mouth such as NSAIDs or Tylenol can generally treat the reactions to steroid and lidocaine. Rare cases of infection have been noted. Rash, hives and itching may occur post injection. If you have muscle pain or cramps, flushing and or swelling of the face, rapid heart beat, nausea, dizziness, fever, chills, headache, difficulty breathing, swelling in the arms or legs, or have a prickly feeling of your skin, contact a health care provider immediately. Following this discussion, the knee was prepped with Alcohol and under sterile condition the 80 mg Depo-Medrol and 6 cc Lidocaine injection was performed with a 20 gauge needle through a superolateral injection site. The needle was introduced into the joint, aspiration was performed to ensure intra-articular placement and the medication was injected. Upon withdrawal of the needle the site was cleaned with alcohol and a band aid applied. The patient tolerated the injection well and there were no adverse effects. Post injection instructions included no strenuous activity for 24 hours, cryotherapy and if there are any adverse effects to contact the office.